# Patient Record
Sex: FEMALE | Race: WHITE | NOT HISPANIC OR LATINO | Employment: OTHER | URBAN - NONMETROPOLITAN AREA
[De-identification: names, ages, dates, MRNs, and addresses within clinical notes are randomized per-mention and may not be internally consistent; named-entity substitution may affect disease eponyms.]

---

## 2017-02-15 ENCOUNTER — APPOINTMENT (OUTPATIENT)
Dept: CT IMAGING | Facility: HOSPITAL | Age: 57
End: 2017-02-15

## 2017-02-15 ENCOUNTER — HOSPITAL ENCOUNTER (OUTPATIENT)
Facility: HOSPITAL | Age: 57
Setting detail: OBSERVATION
Discharge: HOME | End: 2017-02-17
Attending: EMERGENCY MEDICINE | Admitting: HOSPITALIST

## 2017-02-15 ENCOUNTER — APPOINTMENT (OUTPATIENT)
Dept: GENERAL RADIOLOGY | Facility: HOSPITAL | Age: 57
End: 2017-02-15

## 2017-02-15 DIAGNOSIS — R94.31 ABNORMAL EKG: ICD-10-CM

## 2017-02-15 DIAGNOSIS — R42 DIZZINESS: Primary | ICD-10-CM

## 2017-02-15 DIAGNOSIS — I21.4 NON-STEMI (NON-ST ELEVATED MYOCARDIAL INFARCTION) (HCC): ICD-10-CM

## 2017-02-15 LAB
ALBUMIN SERPL-MCNC: 4.4 G/DL (ref 3.4–4.8)
ALBUMIN/GLOB SERPL: 1.4 G/DL (ref 1.1–1.8)
ALP SERPL-CCNC: 80 U/L (ref 38–126)
ALT SERPL W P-5'-P-CCNC: 34 U/L (ref 9–52)
ANION GAP SERPL CALCULATED.3IONS-SCNC: 9 MMOL/L (ref 5–15)
APTT PPP: 25 SECONDS (ref 20–40.3)
AST SERPL-CCNC: 26 U/L (ref 14–36)
BASOPHILS # BLD AUTO: 0.02 10*3/MM3 (ref 0–0.2)
BASOPHILS NFR BLD AUTO: 0.2 % (ref 0–2)
BILIRUB SERPL-MCNC: 0.4 MG/DL (ref 0.2–1.3)
BILIRUB UR QL STRIP: NEGATIVE
BUN BLD-MCNC: 18 MG/DL (ref 7–21)
BUN/CREAT SERPL: 25.4 (ref 7–25)
CALCIUM SPEC-SCNC: 8.8 MG/DL (ref 8.4–10.2)
CHLORIDE SERPL-SCNC: 99 MMOL/L (ref 95–110)
CK MB SERPL-CCNC: 2.64 NG/ML (ref 0–5)
CK SERPL-CCNC: 80 U/L (ref 30–135)
CLARITY UR: ABNORMAL
CO2 SERPL-SCNC: 30 MMOL/L (ref 22–31)
COLOR UR: YELLOW
CREAT BLD-MCNC: 0.71 MG/DL (ref 0.5–1)
D-DIMER, QUANTITATIVE (MAD,POW, STR): <270 NG/ML (FEU) (ref 0–470)
DEPRECATED RDW RBC AUTO: 37.2 FL (ref 36.4–46.3)
EOSINOPHIL # BLD AUTO: 0.18 10*3/MM3 (ref 0–0.7)
EOSINOPHIL NFR BLD AUTO: 1.4 % (ref 0–7)
ERYTHROCYTE [DISTWIDTH] IN BLOOD BY AUTOMATED COUNT: 12.9 % (ref 11.5–14.5)
GFR SERPL CREATININE-BSD FRML MDRD: 102 ML/MIN/1.73 (ref 51–120)
GFR SERPL CREATININE-BSD FRML MDRD: 85 ML/MIN/1.73 (ref 60–120)
GLOBULIN UR ELPH-MCNC: 3.1 GM/DL (ref 2.3–3.5)
GLUCOSE BLD-MCNC: 174 MG/DL (ref 60–100)
GLUCOSE UR STRIP-MCNC: NEGATIVE MG/DL
HCT VFR BLD AUTO: 41.2 % (ref 35–45)
HGB BLD-MCNC: 14.4 G/DL (ref 12–15.5)
HGB UR QL STRIP.AUTO: NEGATIVE
IMM GRANULOCYTES # BLD: 0.03 10*3/MM3 (ref 0–0.02)
IMM GRANULOCYTES NFR BLD: 0.2 % (ref 0–0.5)
INR PPP: 0.94 (ref 0.8–1.2)
KETONES UR QL STRIP: NEGATIVE
LEUKOCYTE ESTERASE UR QL STRIP.AUTO: NEGATIVE
LYMPHOCYTES # BLD AUTO: 1.9 10*3/MM3 (ref 0.6–4.2)
LYMPHOCYTES NFR BLD AUTO: 14.8 % (ref 10–50)
MCH RBC QN AUTO: 28 PG (ref 26.5–34)
MCHC RBC AUTO-ENTMCNC: 35 G/DL (ref 31.4–36)
MCV RBC AUTO: 80 FL (ref 80–98)
MONOCYTES # BLD AUTO: 0.77 10*3/MM3 (ref 0–0.9)
MONOCYTES NFR BLD AUTO: 6 % (ref 0–12)
NEUTROPHILS # BLD AUTO: 9.94 10*3/MM3 (ref 2–8.6)
NEUTROPHILS NFR BLD AUTO: 77.4 % (ref 37–80)
NITRITE UR QL STRIP: NEGATIVE
PH UR STRIP.AUTO: 7 [PH] (ref 5–9)
PLATELET # BLD AUTO: 271 10*3/MM3 (ref 150–450)
PMV BLD AUTO: 10.6 FL (ref 8–12)
POTASSIUM BLD-SCNC: 3.3 MMOL/L (ref 3.5–5.1)
PROT SERPL-MCNC: 7.5 G/DL (ref 6.3–8.6)
PROT UR QL STRIP: NEGATIVE
PROTHROMBIN TIME: 12.6 SECONDS (ref 11.1–15.3)
RBC # BLD AUTO: 5.15 10*6/MM3 (ref 3.77–5.16)
SODIUM BLD-SCNC: 138 MMOL/L (ref 137–145)
SP GR UR STRIP: 1.02 (ref 1–1.03)
UROBILINOGEN UR QL STRIP: ABNORMAL
WBC NRBC COR # BLD: 12.84 10*3/MM3 (ref 3.2–9.8)

## 2017-02-15 PROCEDURE — 81003 URINALYSIS AUTO W/O SCOPE: CPT | Performed by: EMERGENCY MEDICINE

## 2017-02-15 PROCEDURE — 99284 EMERGENCY DEPT VISIT MOD MDM: CPT

## 2017-02-15 PROCEDURE — 84484 ASSAY OF TROPONIN QUANT: CPT | Performed by: EMERGENCY MEDICINE

## 2017-02-15 PROCEDURE — 70450 CT HEAD/BRAIN W/O DYE: CPT

## 2017-02-15 PROCEDURE — 93005 ELECTROCARDIOGRAM TRACING: CPT

## 2017-02-15 PROCEDURE — 85610 PROTHROMBIN TIME: CPT | Performed by: EMERGENCY MEDICINE

## 2017-02-15 PROCEDURE — 85025 COMPLETE CBC W/AUTO DIFF WBC: CPT | Performed by: EMERGENCY MEDICINE

## 2017-02-15 PROCEDURE — 85730 THROMBOPLASTIN TIME PARTIAL: CPT | Performed by: EMERGENCY MEDICINE

## 2017-02-15 PROCEDURE — 82550 ASSAY OF CK (CPK): CPT | Performed by: EMERGENCY MEDICINE

## 2017-02-15 PROCEDURE — 85379 FIBRIN DEGRADATION QUANT: CPT | Performed by: EMERGENCY MEDICINE

## 2017-02-15 PROCEDURE — 82553 CREATINE MB FRACTION: CPT | Performed by: EMERGENCY MEDICINE

## 2017-02-15 PROCEDURE — 96360 HYDRATION IV INFUSION INIT: CPT

## 2017-02-15 PROCEDURE — 71010 HC CHEST PA OR AP: CPT

## 2017-02-15 PROCEDURE — 80053 COMPREHEN METABOLIC PANEL: CPT | Performed by: EMERGENCY MEDICINE

## 2017-02-15 PROCEDURE — 93010 ELECTROCARDIOGRAM REPORT: CPT | Performed by: INTERNAL MEDICINE

## 2017-02-15 RX ORDER — SODIUM CHLORIDE 9 MG/ML
125 INJECTION, SOLUTION INTRAVENOUS CONTINUOUS
Status: DISCONTINUED | OUTPATIENT
Start: 2017-02-15 | End: 2017-02-17 | Stop reason: HOSPADM

## 2017-02-15 RX ORDER — SODIUM CHLORIDE 0.9 % (FLUSH) 0.9 %
10 SYRINGE (ML) INJECTION AS NEEDED
Status: DISCONTINUED | OUTPATIENT
Start: 2017-02-15 | End: 2017-02-17 | Stop reason: HOSPADM

## 2017-02-15 RX ORDER — ASPIRIN 81 MG/1
324 TABLET, CHEWABLE ORAL ONCE
Status: COMPLETED | OUTPATIENT
Start: 2017-02-15 | End: 2017-02-16

## 2017-02-15 RX ORDER — MULTIPLE VITAMINS W/ MINERALS TAB 9MG-400MCG
1 TAB ORAL DAILY
COMMUNITY

## 2017-02-15 RX ADMIN — SODIUM CHLORIDE 125 ML/HR: 900 INJECTION, SOLUTION INTRAVENOUS at 23:19

## 2017-02-15 RX ADMIN — SODIUM CHLORIDE 125 ML/HR: 900 INJECTION, SOLUTION INTRAVENOUS at 23:25

## 2017-02-16 ENCOUNTER — APPOINTMENT (OUTPATIENT)
Dept: ULTRASOUND IMAGING | Facility: HOSPITAL | Age: 57
End: 2017-02-16

## 2017-02-16 ENCOUNTER — APPOINTMENT (OUTPATIENT)
Dept: CARDIOLOGY | Facility: HOSPITAL | Age: 57
End: 2017-02-16
Attending: HOSPITALIST

## 2017-02-16 PROBLEM — I10 HYPERTENSION: Chronic | Status: ACTIVE | Noted: 2017-02-16

## 2017-02-16 PROBLEM — I21.4 NON-STEMI (NON-ST ELEVATED MYOCARDIAL INFARCTION) (HCC): Status: ACTIVE | Noted: 2017-02-16

## 2017-02-16 PROBLEM — R42 DIZZINESS: Status: ACTIVE | Noted: 2017-02-16

## 2017-02-16 LAB
ANION GAP SERPL CALCULATED.3IONS-SCNC: 10 MMOL/L (ref 5–15)
ANISOCYTOSIS BLD QL: NORMAL
BASOPHILS # BLD AUTO: 0.08 10*3/MM3 (ref 0–0.2)
BASOPHILS NFR BLD AUTO: 1.1 % (ref 0–2)
BUN BLD-MCNC: 12 MG/DL (ref 7–21)
BUN/CREAT SERPL: 19 (ref 7–25)
CALCIUM SPEC-SCNC: 8.7 MG/DL (ref 8.4–10.2)
CHLORIDE SERPL-SCNC: 107 MMOL/L (ref 95–110)
CK MB SERPL-CCNC: 17.8 NG/ML (ref 0–5)
CK SERPL-CCNC: 145 U/L (ref 30–135)
CO2 SERPL-SCNC: 25 MMOL/L (ref 22–31)
CREAT BLD-MCNC: 0.63 MG/DL (ref 0.5–1)
DEPRECATED RDW RBC AUTO: 37.7 FL (ref 36.4–46.3)
EOSINOPHIL # BLD AUTO: 0.25 10*3/MM3 (ref 0–0.7)
EOSINOPHIL NFR BLD AUTO: 3.4 % (ref 0–7)
ERYTHROCYTE [DISTWIDTH] IN BLOOD BY AUTOMATED COUNT: 13.3 % (ref 11.5–14.5)
GFR SERPL CREATININE-BSD FRML MDRD: 97 ML/MIN/1.73 (ref 51–120)
GLUCOSE BLD-MCNC: 134 MG/DL (ref 60–100)
HCT VFR BLD AUTO: 38.7 % (ref 35–45)
HGB BLD-MCNC: 13.6 G/DL (ref 12–15.5)
HOLD SPECIMEN: NORMAL
HOLD SPECIMEN: NORMAL
IMM GRANULOCYTES # BLD: 0.02 10*3/MM3 (ref 0–0.02)
IMM GRANULOCYTES NFR BLD: 0.3 % (ref 0–0.5)
LYMPHOCYTES # BLD AUTO: 2.49 10*3/MM3 (ref 0.6–4.2)
LYMPHOCYTES NFR BLD AUTO: 33.7 % (ref 10–50)
MCH RBC QN AUTO: 27.5 PG (ref 26.5–34)
MCHC RBC AUTO-ENTMCNC: 35.1 G/DL (ref 31.4–36)
MCV RBC AUTO: 78.3 FL (ref 80–98)
MICROCYTES BLD QL: NORMAL
MONOCYTES # BLD AUTO: 0.63 10*3/MM3 (ref 0–0.9)
MONOCYTES NFR BLD AUTO: 8.5 % (ref 0–12)
NEUTROPHILS # BLD AUTO: 3.91 10*3/MM3 (ref 2–8.6)
NEUTROPHILS NFR BLD AUTO: 53 % (ref 37–80)
NRBC BLD MANUAL-RTO: 4.5 /100 WBC (ref 0–0)
PLATELET # BLD AUTO: 227 10*3/MM3 (ref 150–450)
PMV BLD AUTO: 11.2 FL (ref 8–12)
POTASSIUM BLD-SCNC: 3.5 MMOL/L (ref 3.5–5.1)
RBC # BLD AUTO: 4.94 10*6/MM3 (ref 3.77–5.16)
SMALL PLATELETS BLD QL SMEAR: ADEQUATE
SODIUM BLD-SCNC: 142 MMOL/L (ref 137–145)
TROPONIN I SERPL-MCNC: 0.02 NG/ML
TROPONIN I SERPL-MCNC: 1.71 NG/ML
TROPONIN I SERPL-MCNC: 3.65 NG/ML
TROPONIN I SERPL-MCNC: 3.79 NG/ML
WBC MORPH BLD: NORMAL
WBC NRBC COR # BLD: 7.38 10*3/MM3 (ref 3.2–9.8)
WHOLE BLOOD HOLD SPECIMEN: NORMAL
WHOLE BLOOD HOLD SPECIMEN: NORMAL

## 2017-02-16 PROCEDURE — 93306 TTE W/DOPPLER COMPLETE: CPT | Performed by: INTERNAL MEDICINE

## 2017-02-16 PROCEDURE — 93010 ELECTROCARDIOGRAM REPORT: CPT | Performed by: INTERNAL MEDICINE

## 2017-02-16 PROCEDURE — G0378 HOSPITAL OBSERVATION PER HR: HCPCS

## 2017-02-16 PROCEDURE — C1894 INTRO/SHEATH, NON-LASER: HCPCS | Performed by: INTERNAL MEDICINE

## 2017-02-16 PROCEDURE — C1760 CLOSURE DEV, VASC: HCPCS | Performed by: INTERNAL MEDICINE

## 2017-02-16 PROCEDURE — 85025 COMPLETE CBC W/AUTO DIFF WBC: CPT | Performed by: HOSPITALIST

## 2017-02-16 PROCEDURE — 82553 CREATINE MB FRACTION: CPT | Performed by: INTERNAL MEDICINE

## 2017-02-16 PROCEDURE — 82550 ASSAY OF CK (CPK): CPT | Performed by: INTERNAL MEDICINE

## 2017-02-16 PROCEDURE — 93005 ELECTROCARDIOGRAM TRACING: CPT | Performed by: HOSPITALIST

## 2017-02-16 PROCEDURE — C1769 GUIDE WIRE: HCPCS | Performed by: INTERNAL MEDICINE

## 2017-02-16 PROCEDURE — 25010000002 MIDAZOLAM PER 1 MG: Performed by: INTERNAL MEDICINE

## 2017-02-16 PROCEDURE — 80048 BASIC METABOLIC PNL TOTAL CA: CPT | Performed by: HOSPITALIST

## 2017-02-16 PROCEDURE — 93306 TTE W/DOPPLER COMPLETE: CPT

## 2017-02-16 PROCEDURE — 25010000002 HYDROMORPHONE PER 4 MG: Performed by: INTERNAL MEDICINE

## 2017-02-16 PROCEDURE — 84484 ASSAY OF TROPONIN QUANT: CPT | Performed by: EMERGENCY MEDICINE

## 2017-02-16 PROCEDURE — C1887 CATHETER, GUIDING: HCPCS | Performed by: INTERNAL MEDICINE

## 2017-02-16 PROCEDURE — 93458 L HRT ARTERY/VENTRICLE ANGIO: CPT | Performed by: INTERNAL MEDICINE

## 2017-02-16 PROCEDURE — 0 IOPAMIDOL PER 1 ML: Performed by: INTERNAL MEDICINE

## 2017-02-16 PROCEDURE — 85007 BL SMEAR W/DIFF WBC COUNT: CPT | Performed by: HOSPITALIST

## 2017-02-16 PROCEDURE — 96361 HYDRATE IV INFUSION ADD-ON: CPT

## 2017-02-16 PROCEDURE — 84484 ASSAY OF TROPONIN QUANT: CPT | Performed by: INTERNAL MEDICINE

## 2017-02-16 PROCEDURE — 93880 EXTRACRANIAL BILAT STUDY: CPT

## 2017-02-16 RX ORDER — ONDANSETRON 4 MG/1
4 TABLET, FILM COATED ORAL EVERY 6 HOURS PRN
Status: DISCONTINUED | OUTPATIENT
Start: 2017-02-16 | End: 2017-02-17 | Stop reason: HOSPADM

## 2017-02-16 RX ORDER — LOSARTAN POTASSIUM 25 MG/1
25 TABLET ORAL
Status: DISCONTINUED | OUTPATIENT
Start: 2017-02-16 | End: 2017-02-17 | Stop reason: HOSPADM

## 2017-02-16 RX ORDER — CARVEDILOL 3.12 MG/1
3.12 TABLET ORAL 2 TIMES DAILY WITH MEALS
Status: DISCONTINUED | OUTPATIENT
Start: 2017-02-16 | End: 2017-02-17 | Stop reason: HOSPADM

## 2017-02-16 RX ORDER — MORPHINE SULFATE 2 MG/ML
1 INJECTION, SOLUTION INTRAMUSCULAR; INTRAVENOUS EVERY 4 HOURS PRN
Status: DISCONTINUED | OUTPATIENT
Start: 2017-02-16 | End: 2017-02-17 | Stop reason: HOSPADM

## 2017-02-16 RX ORDER — ONDANSETRON 4 MG/1
4 TABLET, ORALLY DISINTEGRATING ORAL EVERY 6 HOURS PRN
Status: DISCONTINUED | OUTPATIENT
Start: 2017-02-16 | End: 2017-02-17 | Stop reason: HOSPADM

## 2017-02-16 RX ORDER — CARVEDILOL 3.12 MG/1
3.12 TABLET ORAL EVERY 12 HOURS SCHEDULED
Status: DISCONTINUED | OUTPATIENT
Start: 2017-02-16 | End: 2017-02-16 | Stop reason: SDUPTHER

## 2017-02-16 RX ORDER — SODIUM CHLORIDE 0.9 % (FLUSH) 0.9 %
1-10 SYRINGE (ML) INJECTION AS NEEDED
Status: DISCONTINUED | OUTPATIENT
Start: 2017-02-16 | End: 2017-02-17 | Stop reason: HOSPADM

## 2017-02-16 RX ORDER — SODIUM CHLORIDE 450 MG/100ML
75 INJECTION, SOLUTION INTRAVENOUS CONTINUOUS
Status: DISCONTINUED | OUTPATIENT
Start: 2017-02-16 | End: 2017-02-17 | Stop reason: HOSPADM

## 2017-02-16 RX ORDER — NALOXONE HCL 0.4 MG/ML
0.4 VIAL (ML) INJECTION
Status: DISCONTINUED | OUTPATIENT
Start: 2017-02-16 | End: 2017-02-17 | Stop reason: HOSPADM

## 2017-02-16 RX ORDER — ONDANSETRON 2 MG/ML
4 INJECTION INTRAMUSCULAR; INTRAVENOUS EVERY 6 HOURS PRN
Status: DISCONTINUED | OUTPATIENT
Start: 2017-02-16 | End: 2017-02-17 | Stop reason: HOSPADM

## 2017-02-16 RX ORDER — MIDAZOLAM HYDROCHLORIDE 1 MG/ML
INJECTION INTRAMUSCULAR; INTRAVENOUS AS NEEDED
Status: DISCONTINUED | OUTPATIENT
Start: 2017-02-16 | End: 2017-02-16 | Stop reason: HOSPADM

## 2017-02-16 RX ORDER — LIDOCAINE HYDROCHLORIDE 20 MG/ML
INJECTION, SOLUTION INFILTRATION; PERINEURAL AS NEEDED
Status: DISCONTINUED | OUTPATIENT
Start: 2017-02-16 | End: 2017-02-16 | Stop reason: HOSPADM

## 2017-02-16 RX ADMIN — ASPIRIN 81 MG 324 MG: 81 TABLET ORAL at 01:12

## 2017-02-16 RX ADMIN — CARVEDILOL 3.12 MG: 3.12 TABLET, FILM COATED ORAL at 10:47

## 2017-02-16 RX ADMIN — SODIUM CHLORIDE 75 ML/HR: 4.5 INJECTION, SOLUTION INTRAVENOUS at 15:23

## 2017-02-16 RX ADMIN — SODIUM CHLORIDE 125 ML/HR: 900 INJECTION, SOLUTION INTRAVENOUS at 07:47

## 2017-02-16 RX ADMIN — CARVEDILOL 3.12 MG: 3.12 TABLET, FILM COATED ORAL at 17:27

## 2017-02-16 RX ADMIN — LOSARTAN POTASSIUM 25 MG: 25 TABLET ORAL at 15:40

## 2017-02-16 NOTE — CONSULTS
Cardiology Consultation Note.        Patient Name: Christina Hobson  Age/Sex: 56 y.o. female  : 1960  MRN: [id number]    Date of consultation: 2017  Consulting Physician: Nick Medina MD  Primary care Physician: No Known Provider  Requesting Physician:  Agustín Nguyen MD   Reason for consultation: Positive troponin suggestive for non-Q myocardial infarction  Subjective:       Chief Complaint: Bilateral arm pain with positive troponin with symptoms of atypical chest pain.    History of Present Illness:  Christina Hobson is a 56 y.o. female     Body mass index is 28.19 kg/(m^2). with the past medical history significant for hyperlipidemia currently not on any pharmacological therapy with no previous history of arterial hypertension who presented to the emergency room with symptoms of bilateral arm pain and some symptoms off dizziness.  Patient initial troponin was mildly elevated with subsequent troponin of 1.7.  Patient resting electrocardiogram did not show any acute ST-T wave changes.    On specific questioning patient denies any symptoms of neck pain and jaw pain and arm pain and shoulder pain or any symptoms of epigastric discomfort.  Patient denies any prolonged episodes of palpitation.  Patient denies previous cardiac evaluation.  Patient has been active and has not had any exertional chest pain.        Past Medical History:  1.  Hyperlipidemia.  2.  Bilateral arm pain with positive troponin suggestive for non-Q myocardial infarction.    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     Social History Main Topics   • Smoking status: Never Smoker    • Smokeless tobacco: Not on file   • Alcohol use No   • Drug use: No   • Sexual activity: Not on file     Other Topics Concern   • Not on file     Social History Narrative   • No narrative on file        Cardiac Risk factor: Post menopausal., Hyperlipidemia and Obesity      Allergies:  No Known Allergies    Medication::  Prescriptions Prior to Admission   Medication Sig Dispense Refill Last Dose   • Multiple Vitamins-Minerals (MULTIVITAMIN WITH MINERALS) tablet tablet Take 1 tablet by mouth Daily.   2/16/2017 at Unknown time           Review of Systems:       Constitutional:  Denies recent weight loss, weight gain, fever or chills, no change in exercise tolerance     HENT:  Denies any hearing loss, epistaxis, hoarseness, or difficulty speaking.     Eyes: Wears eyeglasses or contact lenses     Respiratory:  Denies dyspnea with exertion,no cough, wheezing, or hemoptysis.     Cardiovascular: Negative for palpations, chest pain, orthopnea, PND, peripheral edema, syncope, or claudication.     Gastrointestinal:  Denies change in bowel habits, dyspepsia, ulcer disease, hematochezia, or melena.  No nausea, no vomiting, no hematemesis, no diarrhea or constipation, no melena      Endocrine: Negative for cold intolerance, heat intolerance, polydipsia, polyphagia and polyuria. Denies any history of weight change, heat/cold intolerance, polydipsia, polyuria     Genitourinary: Negative hor hematuria.      Musculoskeletal: Positive for bilateral arm pain Denies any history of arthritic symptoms or back problems .  No joint pain, joint stiffness, joint swelling, muscle pain, muscle weakness and neck pain    Skin:  Denies any change in hair or nails, rashes, or skin lesions.     Allergic/Immunologic: Negative.  Negative for environmental allergies, food allergies and immunocompromised state.     Neurological:  Denies any history of recurrent headaches, strokes, TIA, or seizure disorder.     Hematological: Denies excessive  bleeding, easy bruising, fatigue, lymphadenopathy and petechiae or any bleeding disorders, or lymphadenopathy.     Psychiatric/Behavioral: Denies any history of depression, substance abuse, or change in cognitive function. Denies any psychomotor reaction or tangential thought.  No depression, homicidal ideations and suicidal ideations    Endocrine: No frequent urination and nocturia, temperature intolerance, weight gain, unintended and weight loss, unintended            Objective:     Objective:  Vitals:    02/16/17 0744   BP: 145/69   Pulse: 64   Resp: 18   Temp: 96.7 °F (35.9 °C)   SpO2: 98%     .    Body mass index is 28.19 kg/(m^2).           Physical Exam:   General Appearance:    Alert, oriented, cooperative, in no acute distress   Head:    Normocephalic, atraumatic, without obvious abnormality   Eyes:           PAULA  Lids and lashes normal, conjunctivae and sclerae normal, no icterus, no pallor   Ears:    Ears appear intact with no abnormalities noted   Throat:   Mucous membranes pink and moist   Neck:   Supple, trachea midline, no carotid bruit, no organomegaly or JVD   Lungs:     Clear to auscultation and percussion, respirations regular, even and Unlabored. No wheezes, rales, rhonchi    Heart:    Regular rhythm and normal rate, normal S1 and S2, no            murmur, no gallop, no rub, no click   Abdomen:     Soft, non-tender, non-distended, no guarding, no rebound tenderness, Normal bowel sounds in all four quadrant, no masses, liver and spleen nonpalpable,    Genitalia:    Deferred   Extremities:   Moves all extremities well, no edema, no cyanosis, no              Redness, no clubbing   Pulses:   Pulses palpable and equal bilaterally   Skin:   Moist and warm. No bleeding, bruising or rash   Neurologic/Psychiatric:   Alert and oriented to person, place, and time.  Motor, power and tone in upper and lower extremity is grossly intact.  No focal neurological deficits. Normal cognitive function. No  psychomotor reaction or tangential thought. No depression, homicidal ideations and suicidal ideations           Lab Review:       Results from last 7 days  Lab Units 02/16/17  0613 02/15/17  2158   SODIUM mmol/L 142 138   POTASSIUM mmol/L 3.5 3.3*   CHLORIDE mmol/L 107 99   TOTAL CO2 mmol/L 25.0 30.0   BUN mg/dL 12 18   CREATININE mg/dL 0.63 0.71   CALCIUM mg/dL 8.7 8.8   BILIRUBIN mg/dL  --  0.4   ALK PHOS U/L  --  80   ALT (SGPT) U/L  --  34   AST (SGOT) U/L  --  26   GLUCOSE mg/dL 134* 174*       Results from last 7 days  Lab Units 02/16/17  0613 02/15/17  2158   CK TOTAL U/L  --  80   TROPONIN I ng/mL 1.710* 0.016           Results from last 7 days  Lab Units 02/16/17 0613   WBC 10*3/mm3 7.38   HEMOGLOBIN g/dL 13.6   HEMATOCRIT % 38.7   PLATELETS 10*3/mm3 227       Results from last 7 days  Lab Units 02/15/17  2158   INR  0.94   APTT seconds 25.0                   Invalid input(s):  T4,  FREET4    EKG:   ECG/EMG Results (last 24 hours)     Procedure Component Value Units Date/Time    ECG 12 Lead [[id number]] Collected:  02/15/17 2111     Updated:  02/16/17 0200    ECG 12 Lead [[id number]] Collected:  02/16/17 0120     Updated:  02/16/17 0729    Narrative:       Test Reason : PAINBlood Pressure : **/** mmHGVent. Rate : 075 BPM     Atrial Rate : 075 BPM   P-R Int : 134 ms          QRS Dur : 074 ms    QT Int : 406 ms       P-R-T Axes : 051 021 043 degrees   QTc Int : 453 msNormal sinus rhythmNonspecific T wave   abnormalityAbnormal ECGNo previous ECGs availableReferred By:  JUANY           Confirmed By:           Imaging:  Imaging Results (last 24 hours)     Procedure Component Value Units Date/Time    CT Head Without Contrast [[id number]] Collected:  02/15/17 2316     Updated:  02/15/17 2321    Narrative:       Exam: Head CT without contrast    INDICATION: Dizziness    Technical: Routine head CT without contrast. Sagittal and coronal  reconstructions were obtained. CT technique performed using  [employer].    FINDINGS: The  bony calvarium is intact. The imaged paranasal  sinuses and mastoid air cells are clear. No extra-axial fluid  collection. The ventricular system is normal. Normal gray-white  differentiation. No obvious sign of acute infarction. No  intraparenchymal hemorrhage, mass or midline shift.      Impression:       No obvious acute intracranial abnormality. Consider  MRI for further assessment if clinically warranted.    Electronically signed by:  Hossein Rutledge MD  2/15/2017 11:20 PM  CST Workstation: [email or web address]    XR Chest 1 View [[id number]] Collected:  02/15/17 2321     Updated:  02/15/17 2323    Narrative:       Exam: AP portable chest    INDICATION: Numbness, arm pain    FINDINGS: AP portable chest. The bony structures are intact. The  cardiomediastinal elsewhere is unremarkable. Eventration of the  right hemidiaphragm. Lungs are clear. No pneumothorax or pleural  effusion.      Impression:       No acute cardiopulmonary abnormality.    Electronically signed by:  Hossein Rutledge MD  2/15/2017 11:22 PM  CST Workstation: [email or web address]          I personally viewed and interpreted the patient's EKG/Telemetry data.    Assessment:   1.  Bilateral arm pain with positive troponin of 1.7 suggestive for non-Q myocardial infarction.  2.  Hyperlipidemia.  3.  Obesity.  4.  Labile arterial hypertension.              Plan:   1.  Bilateral arm pain with positive troponin suggestive for non-Q myocardial infarction.  Patient clinically has not had any symptoms of substernal chest pain patient repeat electrocardiogram does reveal evidence of poor R-wave progression.  Patient currently is not having any symptoms of substernal chest pain and neck pain jaw pain.  Patient to bilateral arm pain is probably at the angina equivalent.  Patient has been recommended a coronary angiogram.  Risk-benefit treatment option for the coronary angiogram were discussed with the patient and an informed consent was obtained from the patient for the  coronary angiogram.  Patient Mallampati score #2 patient ASA classification was #2 was for minimal sedation was also discussed with the patient and an informed consent was obtained from the patient for the minimal sedation.  Patient would be started on intravenous fluid and would administer Lovenox pending coronary angiogram.  Patient will also be subjected to a two-dimensional echocardiogram to evaluate the left ventricular systolic function and to rule out any regional segmental wall motion abnormality.  2.  Hyperlipidemia.  Patient would undergo repeat lipid profile check and patient will be started on Lipitor 20 mg at bedtime.  3.  Obesity.  Patient has been counseled on weight reduction lifestyle modification and dietary restriction.  4.  Labile arterial hypertension.  Patient would be started on a low dose of a beta blocker carvedilol 3.125 mg twice a day.  Patient has been counseled to decrease his salt intake      Time: time spent in face-to-face evaluation off greater than 60  minutes and interacting and formulating examining and discussing the plan with the patient with 50% of greater time spent in face-to-face interaction.    Nick Medina MD  02/16/17  8:15 AM    EMR Dragon/Transcription disclaimer:   Some of this note may be an electronic transcription/translation of spoken language to printed text. The electronic translation of spoken language may permit erroneous, or at times, nonsensical words or phrases to be inadvertently transcribed; Although I have reviewed the note for such errors, some may still exist.

## 2017-02-16 NOTE — PROGRESS NOTES
Progress Note  Giovanny Nunez MD  Hospitalist     LOS: 0 days   Patient Care Team:  No Known Provider as PCP - General    Chief Complaint: chest / back discomfort    Subjective     Interval History:     Patient Complaints: of chest / back discomfort, much better by now. Denies previous history of cardiac events. She has been in her usual state of health up until these symptoms started.    History taken from: patient    Medication Review:   Current Facility-Administered Medications   Medication Dose Route Frequency Provider Last Rate Last Dose   • carvedilol (COREG) tablet 3.125 mg  3.125 mg Oral Q12H Giovanny Nunez MD       • Morphine sulfate (PF) injection 1 mg  1 mg Intravenous Q4H PRN Agustín Nguyen MD        And   • naloxone (NARCAN) injection 0.4 mg  0.4 mg Intravenous Q5 Min PRN Agustín Nguyen MD       • ondansetron (ZOFRAN) tablet 4 mg  4 mg Oral Q6H PRN Agustín Nguyen MD        Or   • ondansetron ODT (ZOFRAN-ODT) disintegrating tablet 4 mg  4 mg Oral Q6H PRN Agustín Nguyen MD        Or   • ondansetron (ZOFRAN) injection 4 mg  4 mg Intravenous Q6H PRN Agustín Nguyen MD       • sodium chloride 0.9 % flush 1-10 mL  1-10 mL Intravenous PRN Agustín Nguyen MD       • sodium chloride 0.9 % flush 10 mL  10 mL Intravenous PRN Chepe Pastor MD       • sodium chloride 0.9 % flush 10 mL  10 mL Intravenous PRN Chepe Pastor MD       • sodium chloride 0.9 % infusion  125 mL/hr Intravenous Continuous Chepe Pastor  mL/hr at 02/16/17 0747 125 mL/hr at 02/16/17 0747       Review of Systems:   Review of Systems   Constitutional: Positive for fatigue. Negative for chills and fever.   Respiratory: Negative for shortness of breath.    Cardiovascular: Positive for chest pain. Negative for palpitations and leg swelling.   Gastrointestinal: Negative for abdominal distention, abdominal pain, constipation and diarrhea.   Genitourinary: Negative for dysuria and frequency.   Musculoskeletal: Positive for back pain.  Negative for neck pain.   Neurological: Positive for weakness.   Psychiatric/Behavioral: Negative for agitation.   All other systems reviewed and are negative.      Objective     Vital Signs  Temp:  [96.7 °F (35.9 °C)-97.8 °F (36.6 °C)] 96.7 °F (35.9 °C)  Heart Rate:  [58-84] 64  Resp:  [18-20] 18  BP: (137-183)/(69-98) 145/69    Physical Exam:  Physical Exam   Constitutional: She is oriented to person, place, and time. She appears well-developed and well-nourished.   Eyes: EOM are normal. Pupils are equal, round, and reactive to light.   Neck: Neck supple.   Cardiovascular: Normal rate and regular rhythm.    Pulmonary/Chest: Effort normal and breath sounds normal. No respiratory distress. She has no rales.   Abdominal: Soft. Bowel sounds are normal. She exhibits no distension. There is no tenderness.   Musculoskeletal: Normal range of motion. She exhibits no edema or tenderness.   Neurological: She is alert and oriented to person, place, and time.   Skin: Skin is warm.   Psychiatric: She has a normal mood and affect. Her behavior is normal.   Vitals reviewed.       Results Review:    Lab Results (last 24 hours)     Procedure Component Value Units Date/Time    Urinalysis With / Culture If Indicated [[id number]]  (Abnormal) Collected:  02/15/17 2158    Specimen:  Urine from Urine, Clean Catch Updated:  02/15/17 2229     Color, UA Yellow      Appearance, UA Turbid (A)      pH, UA 7.0      Specific Gravity, UA 1.020      Glucose, UA Negative      Ketones, UA Negative      Bilirubin, UA Negative      Blood, UA Negative      Protein, UA Negative      Leuk Esterase, UA Negative      Nitrite, UA Negative      Urobilinogen, UA 0.2 E.U./dL     Narrative:       Urine microscopic not indicated.    CBC & Differential [[id number]] Collected:  02/15/17 2158    Specimen:  Blood Updated:  02/15/17 2246    Narrative:       The following orders were created for panel order CBC & Differential.  Procedure                                Abnormality         Status                     ---------                               -----------         ------                     CBC Auto Differential[[id number]]         Abnormal            Final result                 Please view results for these tests on the individual orders.    CBC Auto Differential [[id number]]  (Abnormal) Collected:  02/15/17 2158    Specimen:  Blood Updated:  02/15/17 2246     WBC 12.84 (H) 10*3/mm3      RBC 5.15 10*6/mm3      Hemoglobin 14.4 g/dL      Hematocrit 41.2 %      MCV 80.0 fL      MCH 28.0 pg      MCHC 35.0 g/dL      RDW 12.9 %      RDW-SD 37.2 fl      MPV 10.6 fL      Platelets 271 10*3/mm3      Neutrophil % 77.4 %      Lymphocyte % 14.8 %      Monocyte % 6.0 %      Eosinophil % 1.4 %      Basophil % 0.2 %      Immature Grans % 0.2 %      Neutrophils, Absolute 9.94 (H) 10*3/mm3      Lymphocytes, Absolute 1.90 10*3/mm3      Monocytes, Absolute 0.77 10*3/mm3      Eosinophils, Absolute 0.18 10*3/mm3      Basophils, Absolute 0.02 10*3/mm3      Immature Grans, Absolute 0.03 (H) 10*3/mm3     CK [[id number]]  (Normal) Collected:  02/15/17 2158    Specimen:  Blood Updated:  02/15/17 2255     Creatine Kinase 80 U/L     Comprehensive Metabolic Panel [[id number]]  (Abnormal) Collected:  02/15/17 2158    Specimen:  Blood Updated:  02/15/17 2300     Glucose 174 (H) mg/dL      BUN 18 mg/dL      Creatinine 0.71 mg/dL      Sodium 138 mmol/L      Potassium 3.3 (L) mmol/L      Chloride 99 mmol/L      CO2 30.0 mmol/L      Calcium 8.8 mg/dL      Total Protein 7.5 g/dL      Albumin 4.40 g/dL      ALT (SGPT) 34 U/L      AST (SGOT) 26 U/L      Alkaline Phosphatase 80 U/L      Total Bilirubin 0.4 mg/dL      eGFR Non African Amer 85 mL/min/1.73      eGFR  African Amer 102 mL/min/1.73      Globulin 3.1 gm/dL      A/G Ratio 1.4 g/dL      BUN/Creatinine Ratio 25.4 (H)      Anion Gap 9.0 mmol/L     CK-MB [[id number]]  (Normal) Collected:  02/15/17 2158    Specimen:  Blood Updated:  02/15/17 2303     CKMB 2.64  ng/mL     aPTT [[id number]]  (Normal) Collected:  02/15/17 2158    Specimen:  Blood Updated:  02/15/17 2307     PTT 25.0 seconds     Narrative:       The recommended Heparin therapeutic range is 68-97 seconds.    Protime-INR [[id number]]  (Normal) Collected:  02/15/17 2158    Specimen:  Blood Updated:  02/15/17 2307     Protime 12.6 Seconds      INR 0.94     Narrative:       Therapeutic range for most indications is 2.0-3.0 INR,  or 2.5-3.5 for mechanical heart valves.    D-dimer, Quantitative [[id number]]  (Normal) Collected:  02/15/17 2158    Specimen:  Blood Updated:  02/15/17 2307     D-Dimer, Quantitative <270 ng/mL (FEU)     Narrative:       Dimer values <500 ng/ml FEU are FDA approved as aid in diagnosis of deep venous thrombosis and pulmonary embolism.  This test should not be used in an exclusion strategy with pretest probability alone.    A recent guideline regarding diagnosis for pulmonary thomboembolism recommends an adjusted exclusion criterion of age x 10 ng/ml FEU for patients >50 years of age ([employer] Intern Med 2015; 163: 701-711).    Troponin [[id number]]  (Normal) Collected:  02/15/17 2158    Specimen:  Blood Updated:  02/16/17 0156     Troponin I 0.016 ng/mL     [place] Draw [[id number]] Collected:  02/15/17 2158    Specimen:  Blood Updated:  02/16/17 0206    Narrative:       The following orders were created for panel order [place] Draw.  Procedure                               Abnormality         Status                     ---------                               -----------         ------                     Light Blue Top[[id number]]                                    Final result               Green Top (Gel)[[id number]]                                   Final result               Lavender Top[[id number]]                                      Final result               Gold Top - SST[[id number]]                                    Final result                 Please view results for these tests on the individual  orders.    Light Blue Top [[id number]] Collected:  02/15/17 2158    Specimen:  Blood Updated:  02/16/17 0206     Extra Tube hold for add-on       Auto resulted       Green Top (Gel) [[id number]] Collected:  02/15/17 2158    Specimen:  Blood Updated:  02/16/17 0206     Extra Tube Hold for add-ons.       Auto resulted.       Lavender Top [[id number]] Collected:  02/15/17 2158    Specimen:  Blood Updated:  02/16/17 0206     Extra Tube hold for add-on       Auto resulted       Gold Top - SST [[id number]] Collected:  02/15/17 2158    Specimen:  Blood Updated:  02/16/17 0206     Extra Tube Hold for add-ons.       Auto resulted.       CBC Auto Differential [[id number]]  (Abnormal) Collected:  02/16/17 0613    Specimen:  Blood Updated:  02/16/17 0717     WBC 7.38 10*3/mm3      RBC 4.94 10*6/mm3      Hemoglobin 13.6 g/dL      Hematocrit 38.7 %      MCV 78.3 (L) fL      MCH 27.5 pg      MCHC 35.1 g/dL      RDW 13.3 %      RDW-SD 37.7 fl      MPV 11.2 fL      Platelets 227 10*3/mm3      Neutrophil % 53.0 %      Lymphocyte % 33.7 %      Monocyte % 8.5 %      Eosinophil % 3.4 %      Basophil % 1.1 %      Immature Grans % 0.3 %      Neutrophils, Absolute 3.91 10*3/mm3      Lymphocytes, Absolute 2.49 10*3/mm3      Monocytes, Absolute 0.63 10*3/mm3      Eosinophils, Absolute 0.25 10*3/mm3      Basophils, Absolute 0.08 10*3/mm3      Immature Grans, Absolute 0.02 10*3/mm3      nRBC 4.5 (H) /100 WBC     Basic Metabolic Panel [[id number]]  (Abnormal) Collected:  02/16/17 0613    Specimen:  Blood Updated:  02/16/17 0729     Glucose 134 (H) mg/dL      BUN 12 mg/dL      Creatinine 0.63 mg/dL      Sodium 142 mmol/L      Potassium 3.5 mmol/L      Chloride 107 mmol/L      CO2 25.0 mmol/L      Calcium 8.7 mg/dL      eGFR Non African Amer 97 mL/min/1.73      BUN/Creatinine Ratio 19.0      Anion Gap 10.0 mmol/L     Troponin [[id number]]  (Abnormal) Collected:  02/16/17 0613    Specimen:  Blood Updated:  02/16/17 0754     Troponin I 1.710 (C) ng/mL     Scan  Slide [[id number]] Collected:  02/16/17 0613    Specimen:  Blood Updated:  02/16/17 0953     Anisocytosis Slight/1+      Microcytes Slight/1+      WBC Morphology Normal      Platelet Estimate Adequate     Troponin [[id number]] Collected:  02/16/17 1001    Specimen:  Blood Updated:  02/16/17 1004          Imaging Results (last 24 hours)     Procedure Component Value Units Date/Time    CT Head Without Contrast [[id number]] Collected:  02/15/17 2316     Updated:  02/15/17 2321    Narrative:       Exam: Head CT without contrast    INDICATION: Dizziness    Technical: Routine head CT without contrast. Sagittal and coronal  reconstructions were obtained. CT technique performed using  [employer].    FINDINGS: The bony calvarium is intact. The imaged paranasal  sinuses and mastoid air cells are clear. No extra-axial fluid  collection. The ventricular system is normal. Normal gray-white  differentiation. No obvious sign of acute infarction. No  intraparenchymal hemorrhage, mass or midline shift.      Impression:       No obvious acute intracranial abnormality. Consider  MRI for further assessment if clinically warranted.    Electronically signed by:  Hossein Rutledge MD  2/15/2017 11:20 PM  CST Workstation: [employer]    XR Chest 1 View [[id number]] Collected:  02/15/17 2321     Updated:  02/15/17 2323    Narrative:       Exam: AP portable chest    INDICATION: Numbness, arm pain    FINDINGS: AP portable chest. The bony structures are intact. The  cardiomediastinal elsewhere is unremarkable. Eventration of the  right hemidiaphragm. Lungs are clear. No pneumothorax or pleural  effusion.      Impression:       No acute cardiopulmonary abnormality.    Electronically signed by:  Hossein Rutledge MD  2/15/2017 11:22 PM  CST Workstation: [employer]    US Carotid Bilateral [[id number]]      Updated:  02/16/17 0916          Assessment/Plan     Principal Problem:    Non-STEMI (non-ST elevated myocardial infarction)  Active Problems:     Hypertension      Continue with the beta blocker, Aspirin, nitroglycerin. Cardiology consult appreciated. She will require a coronary angiogram given her symptoms, Troponin elevation.    Giovanny Nunez MD  02/16/17  10:30 AM

## 2017-02-16 NOTE — H&P
[facility] Medicine Admission      Date of Admission: 2/15/2017      Primary Care Physician: No Known Provider    Following information is obtained partially from patient, family and/or medical records.    Chief Complaint:   Chief Complaint   Patient presents with   • Dizziness   • Arm Pain       HPI: Pt is a 56 y.o. female presenting with sudden onset of bilateral upper extremity pain that happened around 7 PM.  She states that the pain started at the elbow and radiated distally, describes it as aching, associated symptoms include lightheadedness and diaphoresis.  She denies any active chest pain at the time, denies any fever or chills no shortness of breath.  Her symptoms lasted roughly 3-4 hours, she came to the ER as soon after being in the ER her symptoms resolved.  At the time of my examination she is symptom-free.       Concurrent Medical History:   Patient Active Problem List   Diagnosis   • Dizziness    patient reports no medical problems in the past    Past Surgical History: History reviewed. No pertinent past surgical history.    Family History: family history is not on file.    Social History:  reports that she has never smoked. She does not have any smokeless tobacco history on file. She reports that she does not drink alcohol or use illicit drugs.    Allergies: No Known Allergies    Home Medications:   Prior to Admission medications    Medication Sig Start Date End Date Taking? Authorizing Provider   Multiple Vitamins-Minerals (MULTIVITAMIN WITH MINERALS) tablet tablet Take 1 tablet by mouth Daily.   Yes Historical Provider, MD       Review of Systems:  Review of Systems   Constitutional: Negative.    HENT: Negative.    Eyes: Negative.    Respiratory: Negative.    Cardiovascular: Negative.    Gastrointestinal: Negative.    Endocrine: Negative.    Genitourinary: Negative.    Musculoskeletal: Positive for myalgias.   Skin: Negative.    Neurological: Positive  for syncope.      Otherwise complete ROS is negative except as mentioned above and in HPI.    Physical Exam:   Temp:  [97.8 °F (36.6 °C)] 97.8 °F (36.6 °C)  Heart Rate:  [64-84] 80  Resp:  [18-20] 20  BP: (146-183)/(70-98) 146/70  Physical Exam   Constitutional: She is oriented to person, place, and time. She appears well-developed and well-nourished.   HENT:   Head: Normocephalic and atraumatic.   Nose: Nose normal.   Mouth/Throat: Oropharynx is clear and moist.   Eyes: Conjunctivae are normal. Pupils are equal, round, and reactive to light. No scleral icterus.   Neck: No tracheal deviation present.   Cardiovascular: Normal heart sounds.  Exam reveals no friction rub.    Pulmonary/Chest: Effort normal and breath sounds normal. No respiratory distress. She has no wheezes. She has no rales.   Abdominal: Soft. Bowel sounds are normal. She exhibits no distension. There is no tenderness.   Musculoskeletal: Normal range of motion.   Neurological: She is alert and oriented to person, place, and time.   Skin: Skin is warm and dry.         Results Reviewed:  I have personally reviewed current lab, radiology, and data and agree with results.  Lab Results (last 24 hours)     Procedure Component Value Units Date/Time    Urinalysis With / Culture If Indicated [[id number]]  (Abnormal) Collected:  02/15/17 2158    Specimen:  Urine from Urine, Clean Catch Updated:  02/15/17 2229     Color, UA Yellow      Appearance, UA Turbid (A)      pH, UA 7.0      Specific Gravity, UA 1.020      Glucose, UA Negative      Ketones, UA Negative      Bilirubin, UA Negative      Blood, UA Negative      Protein, UA Negative      Leuk Esterase, UA Negative      Nitrite, UA Negative      Urobilinogen, UA 0.2 E.U./dL     Narrative:       Urine microscopic not indicated.    CBC & Differential [[id number]] Collected:  02/15/17 2158    Specimen:  Blood Updated:  02/15/17 2246    Narrative:       The following orders were created for panel order CBC &  Differential.  Procedure                               Abnormality         Status                     ---------                               -----------         ------                     CBC Auto Differential[[id number]]         Abnormal            Final result                 Please view results for these tests on the individual orders.    CBC Auto Differential [[id number]]  (Abnormal) Collected:  02/15/17 2158    Specimen:  Blood Updated:  02/15/17 2246     WBC 12.84 (H) 10*3/mm3      RBC 5.15 10*6/mm3      Hemoglobin 14.4 g/dL      Hematocrit 41.2 %      MCV 80.0 fL      MCH 28.0 pg      MCHC 35.0 g/dL      RDW 12.9 %      RDW-SD 37.2 fl      MPV 10.6 fL      Platelets 271 10*3/mm3      Neutrophil % 77.4 %      Lymphocyte % 14.8 %      Monocyte % 6.0 %      Eosinophil % 1.4 %      Basophil % 0.2 %      Immature Grans % 0.2 %      Neutrophils, Absolute 9.94 (H) 10*3/mm3      Lymphocytes, Absolute 1.90 10*3/mm3      Monocytes, Absolute 0.77 10*3/mm3      Eosinophils, Absolute 0.18 10*3/mm3      Basophils, Absolute 0.02 10*3/mm3      Immature Grans, Absolute 0.03 (H) 10*3/mm3     CK [[id number]]  (Normal) Collected:  02/15/17 2158    Specimen:  Blood Updated:  02/15/17 2255     Creatine Kinase 80 U/L     Comprehensive Metabolic Panel [[id number]]  (Abnormal) Collected:  02/15/17 2158    Specimen:  Blood Updated:  02/15/17 2300     Glucose 174 (H) mg/dL      BUN 18 mg/dL      Creatinine 0.71 mg/dL      Sodium 138 mmol/L      Potassium 3.3 (L) mmol/L      Chloride 99 mmol/L      CO2 30.0 mmol/L      Calcium 8.8 mg/dL      Total Protein 7.5 g/dL      Albumin 4.40 g/dL      ALT (SGPT) 34 U/L      AST (SGOT) 26 U/L      Alkaline Phosphatase 80 U/L      Total Bilirubin 0.4 mg/dL      eGFR Non African Amer 85 mL/min/1.73      eGFR  African Amer 102 mL/min/1.73      Globulin 3.1 gm/dL      A/G Ratio 1.4 g/dL      BUN/Creatinine Ratio 25.4 (H)      Anion Gap 9.0 mmol/L     CK-MB [[id number]]  (Normal) Collected:  02/15/17 3932     Specimen:  Blood Updated:  02/15/17 2303     CKMB 2.64 ng/mL     aPTT [[id number]]  (Normal) Collected:  02/15/17 2158    Specimen:  Blood Updated:  02/15/17 2307     PTT 25.0 seconds     Narrative:       The recommended Heparin therapeutic range is 68-97 seconds.    Protime-INR [[id number]]  (Normal) Collected:  02/15/17 2158    Specimen:  Blood Updated:  02/15/17 2307     Protime 12.6 Seconds      INR 0.94     Narrative:       Therapeutic range for most indications is 2.0-3.0 INR,  or 2.5-3.5 for mechanical heart valves.    D-dimer, Quantitative [[id number]]  (Normal) Collected:  02/15/17 2158    Specimen:  Blood Updated:  02/15/17 2307     D-Dimer, Quantitative <270 ng/mL (FEU)     Narrative:       Dimer values <500 ng/ml FEU are FDA approved as aid in diagnosis of deep venous thrombosis and pulmonary embolism.  This test should not be used in an exclusion strategy with pretest probability alone.    A recent guideline regarding diagnosis for pulmonary thomboembolism recommends an adjusted exclusion criterion of age x 10 ng/ml FEU for patients >50 years of age (Olga Intern Med 2015; 163: 701-711).    Troponin [[id number]]  (Normal) Collected:  02/15/17 2158    Specimen:  Blood Updated:  02/16/17 0156     Troponin I 0.016 ng/mL     [place] Draw [[id number]] Collected:  02/15/17 2158    Specimen:  Blood Updated:  02/16/17 0206    Narrative:       The following orders were created for panel order [place] Draw.  Procedure                               Abnormality         Status                     ---------                               -----------         ------                     Light Blue Top[[id number]]                                    Final result               Green Top (Gel)[[id number]]                                   Final result               Lavender Top[[id number]]                                      Final result               Gold Top - SST[[id number]]                                    Final result                  Please view results for these tests on the individual orders.    Light Blue Top [[id number]] Collected:  02/15/17 2158    Specimen:  Blood Updated:  02/16/17 0206     Extra Tube hold for add-on       Auto resulted       Green Top (Gel) [[id number]] Collected:  02/15/17 2158    Specimen:  Blood Updated:  02/16/17 0206     Extra Tube Hold for add-ons.       Auto resulted.       Lavender Top [[id number]] Collected:  02/15/17 2158    Specimen:  Blood Updated:  02/16/17 0206     Extra Tube hold for add-on       Auto resulted       Gold Top - SST [[id number]] Collected:  02/15/17 2158    Specimen:  Blood Updated:  02/16/17 0206     Extra Tube Hold for add-ons.       Auto resulted.           Imaging Results (last 24 hours)     Procedure Component Value Units Date/Time    CT Head Without Contrast [[id number]] Collected:  02/15/17 2316     Updated:  02/15/17 2321    Narrative:       Exam: Head CT without contrast    INDICATION: Dizziness    Technical: Routine head CT without contrast. Sagittal and coronal  reconstructions were obtained. CT technique performed using  [email or web address].    FINDINGS: The bony calvarium is intact. The imaged paranasal  sinuses and mastoid air cells are clear. No extra-axial fluid  collection. The ventricular system is normal. Normal gray-white  differentiation. No obvious sign of acute infarction. No  intraparenchymal hemorrhage, mass or midline shift.      Impression:       No obvious acute intracranial abnormality. Consider  MRI for further assessment if clinically warranted.    Electronically signed by:  Hossein Rutledge MD  2/15/2017 11:20 PM  CST Workstation: [id number]    [facility] Chest 1 View [[id number]] Collected:  02/15/17 2321     Updated:  02/15/17 2323    Narrative:       Exam: AP portable chest    INDICATION: Numbness, arm pain    FINDINGS: AP portable chest. The bony structures are intact. The  cardiomediastinal elsewhere is unremarkable. Eventration of the  right hemidiaphragm. Lungs are clear. No  pneumothorax or pleural  effusion.      Impression:       No acute cardiopulmonary abnormality.    Electronically signed by:  Hossein Rutledge MD  2/15/2017 11:22 PM  CST Workstation: [employer]              Assessment and Plan:  Near syncopal episode: Will obtain echocardiogram, will obtain carotid Doppler, will place patient on telemetry, will obtain cardiac enzymes.     I discussed the patients findings and my recommendations with: Patient and family    Agustín Nguyen MD  02/16/17  5:20 AM

## 2017-02-16 NOTE — PLAN OF CARE
Problem: Patient Care Overview (Adult)  Goal: Plan of Care Review  Outcome: Ongoing (interventions implemented as appropriate)    02/16/17 1642   Coping/Psychosocial Response Interventions   Plan Of Care Reviewed With patient   Patient Care Overview   Progress improving   Outcome Evaluation   Outcome Summary/Follow up Plan had HC today, tolerated well       Goal: Adult Individualization and Mutuality  Outcome: Ongoing (interventions implemented as appropriate)  Goal: Discharge Needs Assessment  Outcome: Ongoing (interventions implemented as appropriate)    Problem: Fall Risk (Adult)  Goal: Identify Related Risk Factors and Signs and Symptoms  Outcome: Outcome(s) achieved Date Met:  02/16/17  Goal: Absence of Falls  Outcome: Ongoing (interventions implemented as appropriate)

## 2017-02-16 NOTE — ED PROVIDER NOTES
Subjective   HPI Comments: 57yo female presents ED c/o onset bilateral upper extremity pain 1900hrs extending from bilateral elbows to hands, nontraumatic.  ROS (+) dizziness.  Denies fever/chest pain/soa/syncope/palpitations.    Patient is a 56 y.o. female presenting with general illness.   Illness   Severity:  Moderate  Onset quality:  Sudden  Progression:  Improving  Chronicity:  New  Associated symptoms: no abdominal pain, no chest pain, no fever, no nausea, no shortness of breath and no vomiting        Review of Systems   Constitutional: Negative for fever.   Respiratory: Negative for shortness of breath.    Cardiovascular: Negative for chest pain and palpitations.   Gastrointestinal: Negative for abdominal pain, nausea and vomiting.   Neurological: Positive for dizziness. Negative for syncope, weakness and numbness.       History reviewed. No pertinent past medical history.    No Known Allergies    History reviewed. No pertinent past surgical history.    History reviewed. No pertinent family history.    Social History     Social History   • Marital status:      Spouse name: N/A   • Number of children: N/A   • Years of education: N/A     Social History Main Topics   • Smoking status: Never Smoker   • Smokeless tobacco: None   • Alcohol use No   • Drug use: No   • Sexual activity: Not Asked     Other Topics Concern   • None     Social History Narrative   • None           Objective   Physical Exam   Constitutional: She is oriented to person, place, and time. She appears well-developed and well-nourished.   HENT:   Head: Normocephalic and atraumatic.   Mouth/Throat: Oropharynx is clear and moist.   Eyes: EOM are normal. Pupils are equal, round, and reactive to light.   Neck: Normal range of motion. Neck supple. No JVD present. No tracheal deviation present.   Cardiovascular: Normal rate, regular rhythm, normal heart sounds and intact distal pulses.  Exam reveals no gallop and no friction rub.    No murmur  heard.  Pulmonary/Chest: Effort normal and breath sounds normal. No respiratory distress. She has no wheezes. She has no rales.   Abdominal: Soft. There is no tenderness. There is no rebound and no guarding.   Musculoskeletal: Normal range of motion. She exhibits no tenderness.   Lymphadenopathy:     She has no cervical adenopathy.   Neurological: She is alert and oriented to person, place, and time. She has normal strength. No cranial nerve deficit or sensory deficit. Coordination normal. GCS eye subscore is 4. GCS verbal subscore is 5. GCS motor subscore is 6.   Nursing note and vitals reviewed.      ECG 12 Lead    Date/Time: 2/15/2017 11:54 PM  Performed by: BENNY HARRINGTON  Authorized by: BENNY HARRINGTON   Interpreted by physician  Rhythm: sinus rhythm  Rate: normal  BPM: 72  QRS axis: normal  Conduction: conduction normal  ST Segments: ST segments normal  T elevation: III  Other: no other findings  Clinical impression: non-specific ECG                 ED Course  ED Course   Comment By Time   Ekg#2:nsr/rate 75/nl axis Benny Harrington MD 02/16 0127   Pt resting comfortably w/o complaints. Benny Harrington MD 02/16 0158      Labs Reviewed   URINALYSIS W/ CULTURE IF INDICATED - Abnormal; Notable for the following:        Result Value    Appearance, UA Turbid (*)     All other components within normal limits    Narrative:     Urine microscopic not indicated.   COMPREHENSIVE METABOLIC PANEL - Abnormal; Notable for the following:     Glucose 174 (*)     Potassium 3.3 (*)     BUN/Creatinine Ratio 25.4 (*)     All other components within normal limits   CBC WITH AUTO DIFFERENTIAL - Abnormal; Notable for the following:     WBC 12.84 (*)     Neutrophils, Absolute 9.94 (*)     Immature Grans, Absolute 0.03 (*)     All other components within normal limits   PROTIME-INR - Normal    Narrative:     Therapeutic range for most indications is 2.0-3.0 INR,  or 2.5-3.5 for mechanical heart valves.   APTT - Normal    Narrative:     The  recommended Heparin therapeutic range is 68-97 seconds.   TROPONIN (IN-HOUSE) - Normal   CK - Normal   CK MB - Normal   D-DIMER, QUANTITATIVE - Normal    Narrative:     Dimer values <500 ng/ml FEU are FDA approved as aid in diagnosis of deep venous thrombosis and pulmonary embolism.  This test should not be used in an exclusion strategy with pretest probability alone.    A recent guideline regarding diagnosis for pulmonary thomboembolism recommends an adjusted exclusion criterion of age x 10 ng/ml FEU for patients >50 years of age (Olga Intern Med 2015; 163: 701-711).   RAINBOW DRAW    Narrative:     The following orders were created for panel order [place] Draw.  Procedure                               Abnormality         Status                     ---------                               -----------         ------                     Light Blue Top[[id number]]                                    In process                 Green Top (Gel)[[id number]]                                   In process                 Lavender Top[[id number]]                                      In process                 Gold Top - SST[[id number]]                                    In process                   Please view results for these tests on the individual orders.   TROPONIN (IN-HOUSE)   CBC AND DIFFERENTIAL    Narrative:     The following orders were created for panel order CBC & Differential.  Procedure                               Abnormality         Status                     ---------                               -----------         ------                     CBC Auto Differential[[id number]]         Abnormal            Final result                 Please view results for these tests on the individual orders.   LIGHT BLUE TOP   GREEN TOP   LAVENDER TOP   GOLD TOP - SST   Ct Head Without Contrast    Result Date: 2/15/2017  Narrative: Exam: Head CT without contrast INDICATION: Dizziness Technical: Routine head CT without contrast. Sagittal  and coronal reconstructions were obtained. CT technique performed using ALARA. FINDINGS: The bony calvarium is intact. The imaged paranasal sinuses and mastoid air cells are clear. No extra-axial fluid collection. The ventricular system is normal. Normal gray-white differentiation. No obvious sign of acute infarction. No intraparenchymal hemorrhage, mass or midline shift.     Impression: No obvious acute intracranial abnormality. Consider MRI for further assessment if clinically warranted. Electronically signed by:  Hossein Rutledge MD  2/15/2017 11:20 PM CST Workstation: [employer]     Chest 1 View    Result Date: 2/15/2017  Narrative: Exam: AP portable chest INDICATION: Numbness, arm pain FINDINGS: AP portable chest. The bony structures are intact. The cardiomediastinal elsewhere is unremarkable. Eventration of the right hemidiaphragm. Lungs are clear. No pneumothorax or pleural effusion.     Impression: No acute cardiopulmonary abnormality. Electronically signed by:  Hossein Rutledge MD  2/15/2017 11:22 PM CST Workstation: [employer] minutes              [facility]    Final diagnoses:   Dizziness   Abnormal EKG            Chepe Pastor MD  02/16/17 0200

## 2017-02-17 VITALS
WEIGHT: 154.1 LBS | OXYGEN SATURATION: 97 % | TEMPERATURE: 97.2 F | HEIGHT: 62 IN | BODY MASS INDEX: 28.36 KG/M2 | DIASTOLIC BLOOD PRESSURE: 72 MMHG | SYSTOLIC BLOOD PRESSURE: 118 MMHG | RESPIRATION RATE: 18 BRPM | HEART RATE: 84 BPM

## 2017-02-17 PROBLEM — I51.4 MYOCARDITIS (HCC): Status: ACTIVE | Noted: 2017-02-17

## 2017-02-17 LAB
ANION GAP SERPL CALCULATED.3IONS-SCNC: 7 MMOL/L (ref 5–15)
BUN BLD-MCNC: 9 MG/DL (ref 7–21)
BUN/CREAT SERPL: 14.5 (ref 7–25)
CALCIUM SPEC-SCNC: 8.4 MG/DL (ref 8.4–10.2)
CHLORIDE SERPL-SCNC: 105 MMOL/L (ref 95–110)
CO2 SERPL-SCNC: 28 MMOL/L (ref 22–31)
CREAT BLD-MCNC: 0.62 MG/DL (ref 0.5–1)
DEPRECATED RDW RBC AUTO: 39.6 FL (ref 36.4–46.3)
ERYTHROCYTE [DISTWIDTH] IN BLOOD BY AUTOMATED COUNT: 13.4 % (ref 11.5–14.5)
GFR SERPL CREATININE-BSD FRML MDRD: 99 ML/MIN/1.73 (ref 60–120)
GLUCOSE BLD-MCNC: 109 MG/DL (ref 60–100)
HCT VFR BLD AUTO: 40.9 % (ref 35–45)
HGB BLD-MCNC: 13.9 G/DL (ref 12–15.5)
MCH RBC QN AUTO: 27.4 PG (ref 26.5–34)
MCHC RBC AUTO-ENTMCNC: 34 G/DL (ref 31.4–36)
MCV RBC AUTO: 80.7 FL (ref 80–98)
PLATELET # BLD AUTO: 247 10*3/MM3 (ref 150–450)
PMV BLD AUTO: 9.6 FL (ref 8–12)
POTASSIUM BLD-SCNC: 3.8 MMOL/L (ref 3.5–5.1)
RBC # BLD AUTO: 5.07 10*6/MM3 (ref 3.77–5.16)
SODIUM BLD-SCNC: 140 MMOL/L (ref 137–145)
WBC NRBC COR # BLD: 7.39 10*3/MM3 (ref 3.2–9.8)

## 2017-02-17 PROCEDURE — G0378 HOSPITAL OBSERVATION PER HR: HCPCS

## 2017-02-17 PROCEDURE — 80048 BASIC METABOLIC PNL TOTAL CA: CPT | Performed by: INTERNAL MEDICINE

## 2017-02-17 PROCEDURE — 85027 COMPLETE CBC AUTOMATED: CPT | Performed by: INTERNAL MEDICINE

## 2017-02-17 RX ORDER — CARVEDILOL 3.12 MG/1
3.12 TABLET ORAL 2 TIMES DAILY WITH MEALS
Qty: 60 TABLET | Refills: 1 | Status: SHIPPED | OUTPATIENT
Start: 2017-02-17 | End: 2017-02-23 | Stop reason: DRUGHIGH

## 2017-02-17 RX ORDER — ASPIRIN 81 MG/1
81 TABLET ORAL DAILY
Qty: 30 TABLET | Refills: 1 | Status: SHIPPED | OUTPATIENT
Start: 2017-02-17

## 2017-02-17 RX ORDER — ATORVASTATIN CALCIUM 10 MG/1
10 TABLET, FILM COATED ORAL DAILY
Qty: 10 TABLET | Refills: 1 | Status: SHIPPED | OUTPATIENT
Start: 2017-02-17 | End: 2017-02-23 | Stop reason: SDUPTHER

## 2017-02-17 RX ORDER — LOSARTAN POTASSIUM 25 MG/1
25 TABLET ORAL
Qty: 25 TABLET | Refills: 1 | Status: SHIPPED | OUTPATIENT
Start: 2017-02-17 | End: 2017-02-23 | Stop reason: SDUPTHER

## 2017-02-17 RX ADMIN — SODIUM CHLORIDE 75 ML/HR: 4.5 INJECTION, SOLUTION INTRAVENOUS at 04:03

## 2017-02-17 RX ADMIN — CARVEDILOL 3.12 MG: 3.12 TABLET, FILM COATED ORAL at 09:12

## 2017-02-17 RX ADMIN — LOSARTAN POTASSIUM 25 MG: 25 TABLET ORAL at 09:13

## 2017-02-17 NOTE — PLAN OF CARE
Problem: Patient Care Overview (Adult)  Goal: Adult Individualization and Mutuality  Outcome: Ongoing (interventions implemented as appropriate)    02/16/17 1642   Individualization   Patient Specific Preferences likes grape juice       Goal: Discharge Needs Assessment  Outcome: Ongoing (interventions implemented as appropriate)    Problem: Fall Risk (Adult)  Goal: Absence of Falls  Outcome: Ongoing (interventions implemented as appropriate)  Steady gait. No falls

## 2017-02-17 NOTE — DISCHARGE SUMMARY
Discharge Summary  Giovanny Nunez MD  Hospitalist     Date of Discharge:  2/17/2017    Discharge Diagnosis: Small AMI (Non STEMI) vs possible Myocarditis    Presenting Problem/History of Present Illness  Dizziness [R42] / Chest pain  Possible AMI (Non STEMI) vs Myocarditis  Mild HTN     Hospital Course  Patient is a 56 y.o. female presented with dyspnea, chest discomfort, non specific ECG abnormalities, elevated Troponin values (up to 3.2). Her cardiac cath showed minimal coronary artery disease - there was no need for angioplasty / stenting. She will be managed medically.      Procedures Performed  Procedure(s):  Left Heart Cath    Consults:   Consults     Date and Time Order Name Status Description    2/16/2017 0817 Inpatient Consult to Cardiothoracic Surgery      2/16/2017 0809 Inpatient Consult to Cardiology Completed     2/16/2017 0200 Hospitalist (on-call MD unless specified)            Pertinent Test Results: angiography: minimal coronary artery disease - no need for angioplasty    Condition on Discharge:  stable    Vital Signs  Temp:  [96.3 °F (35.7 °C)-98 °F (36.7 °C)] 97.2 °F (36.2 °C)  Heart Rate:  [63-84] 84  Resp:  [18] 18  BP: (111-161)/(59-79) 118/72    Physical Exam:  Physical Exam   Constitutional: She is oriented to person, place, and time. She appears well-developed and well-nourished.   HENT:   Head: Normocephalic and atraumatic.   Eyes: EOM are normal.   Neck: Neck supple.   Cardiovascular: Normal rate and regular rhythm.    No murmur heard.  Pulmonary/Chest: Effort normal and breath sounds normal. No respiratory distress. She has no rales.   Abdominal: Soft. Bowel sounds are normal. She exhibits no distension. There is no tenderness.   Musculoskeletal: She exhibits no edema.   Neurological: She is alert and oriented to person, place, and time.   Skin: Skin is warm and dry.   Psychiatric: She has a normal mood and affect.   Vitals reviewed.      Discharge Disposition  Home or Self  Care    Discharge Medications   Christina Hobson   Home Medication Instructions MAKAYLA:[id number]    Printed on:02/17/17 1019   Medication Information                      aspirin 81 MG EC tablet  Take 1 tablet by mouth Daily.             atorvastatin (LIPITOR) 10 MG tablet  Take 1 tablet by mouth Daily.             carvedilol (COREG) 3.125 MG tablet  Take 1 tablet by mouth 2 (Two) Times a Day With Meals.             losartan (COZAAR) 25 MG tablet  Take 1 tablet by mouth Daily.             Multiple Vitamins-Minerals (MULTIVITAMIN WITH MINERALS) tablet tablet  Take 1 tablet by mouth Daily.                 Discharge Diet:   Diet Instructions     Advance Diet As Tolerated                     Activity at Discharge:   Activity Instructions     Activity as Tolerated                     Follow-up Appointments  No future appointments.  Referrals and Follow-ups to Schedule     Additional Follow-Up    As directed    Dr Medina in 6 weeks       Follow-Up    As directed    PCP of choice in 1 week                    Giovanny Nunez MD  02/17/17  10:19 AM

## 2017-02-23 ENCOUNTER — OFFICE VISIT (OUTPATIENT)
Dept: FAMILY MEDICINE CLINIC | Facility: CLINIC | Age: 57
End: 2017-02-23

## 2017-02-23 ENCOUNTER — TELEPHONE (OUTPATIENT)
Dept: FAMILY MEDICINE CLINIC | Facility: CLINIC | Age: 57
End: 2017-02-23

## 2017-02-23 VITALS
DIASTOLIC BLOOD PRESSURE: 90 MMHG | TEMPERATURE: 97.3 F | WEIGHT: 152.2 LBS | HEART RATE: 98 BPM | SYSTOLIC BLOOD PRESSURE: 150 MMHG | OXYGEN SATURATION: 98 % | HEIGHT: 63 IN | BODY MASS INDEX: 26.97 KG/M2

## 2017-02-23 DIAGNOSIS — I20.8 ATYPICAL ANGINA (HCC): ICD-10-CM

## 2017-02-23 DIAGNOSIS — I10 ESSENTIAL HYPERTENSION: Primary | ICD-10-CM

## 2017-02-23 PROCEDURE — 99203 OFFICE O/P NEW LOW 30 MIN: CPT | Performed by: FAMILY MEDICINE

## 2017-02-23 RX ORDER — LOSARTAN POTASSIUM 25 MG/1
25 TABLET ORAL
Qty: 30 TABLET | Refills: 1 | Status: SHIPPED | OUTPATIENT
Start: 2017-02-23

## 2017-02-23 RX ORDER — ATORVASTATIN CALCIUM 10 MG/1
10 TABLET, FILM COATED ORAL DAILY
Qty: 30 TABLET | Refills: 1 | Status: SHIPPED | OUTPATIENT
Start: 2017-02-23

## 2017-02-23 RX ORDER — NITROGLYCERIN 0.4 MG/1
0.4 TABLET SUBLINGUAL
Qty: 25 TABLET | Refills: 12 | Status: SHIPPED | OUTPATIENT
Start: 2017-02-23

## 2017-02-23 RX ORDER — CARVEDILOL 6.25 MG/1
6.25 TABLET ORAL 2 TIMES DAILY WITH MEALS
Qty: 60 TABLET | Refills: 5 | Status: SHIPPED | OUTPATIENT
Start: 2017-02-23

## 2017-02-23 NOTE — TELEPHONE ENCOUNTER
----- Message from Angelina Buck MA sent at 2/23/2017  1:21 PM CST -----  Regarding: FW: Prescription Question  Contact: [phone]   I CALLED PATIENT. SHE NEEDS REFILLS OF THESE 2 MEDS IF YOU WANT HER TO KEEP TAKING THEM.  [facility]    ----- Message -----     From: Christina KERR     Sent: 2/23/2017  12:28 PM       To: Mathieu Deluna [facility]  Subject: Prescription Question                            Hi. Am I getting a refill on losartan and atorvastatin ? Thx

## 2017-02-23 NOTE — PROGRESS NOTES
Subjective   Christina KERR is a 56 y.o. female.     History of Present Illness     Indigestion and left arm pain, elbow down.  Dizzy and didn't feel well.  Went to er.  Troponin I up to 3.790, clear coronary arteries via heart cath.  Started on coreg 3.125mg bid.    Has follow up with dr brown 1 month.  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   Pmh:  ? nonstemi 2/15/17   Psh:  D and c 24 yrs ago  Sh:  From [place], now owns and works a farm.  No tobacco, etoh, drugs.  .  Fh:  Dad lymphoma, mother heart problems.      Review of Systems   Constitutional: Negative for chills, fatigue and fever.   HENT: Negative for congestion, ear discharge, ear pain, facial swelling, hearing loss, postnasal drip, rhinorrhea, sinus pressure, sore throat, trouble swallowing and voice change.    Eyes: Negative for discharge, redness and visual disturbance.   Respiratory: Negative for cough, chest tightness, shortness of breath and wheezing.    Cardiovascular: Negative for chest pain and palpitations.   Gastrointestinal: Negative for abdominal pain, blood in stool, constipation, diarrhea, nausea and vomiting.   Endocrine: Negative for polydipsia and polyuria.   Genitourinary: Negative for dysuria, flank pain, hematuria and urgency.   Musculoskeletal: Negative for arthralgias, back pain, joint swelling and myalgias.   Skin: Negative for rash.   Neurological: Negative for dizziness, weakness, numbness and headaches.   Hematological: Negative for adenopathy.   Psychiatric/Behavioral: Negative for confusion and sleep disturbance. The patient is not nervous/anxious.        Objective   Physical Exam   Constitutional: She is oriented to person, place, and time. She appears well-developed and well-nourished.   HENT:   Head: Normocephalic and atraumatic.   Right Ear: External ear normal.   Left Ear: External ear normal.   Nose: Nose normal.   Mouth/Throat: Oropharynx is clear and moist.   Eyes: Conjunctivae and EOM  are normal. Pupils are equal, round, and reactive to light.   Neck: Normal range of motion. Neck supple.   Cardiovascular: Normal rate, regular rhythm and normal heart sounds.  Exam reveals no gallop and no friction rub.    No murmur heard.  Pulmonary/Chest: Effort normal and breath sounds normal.   Abdominal: Soft. Bowel sounds are normal. She exhibits no distension. There is no tenderness. There is no rebound and no guarding.   Musculoskeletal: Normal range of motion. She exhibits no edema or deformity.   Neurological: She is alert and oriented to person, place, and time. No cranial nerve deficit.   Skin: Skin is warm and dry. No rash noted. No erythema.   Psychiatric: She has a normal mood and affect. Her behavior is normal. Judgment and thought content normal.   Nursing note and vitals reviewed.      Assessment/Plan   Christina was seen today for follow-up.    Diagnoses and all orders for this visit:    Essential hypertension  -     carvedilol (COREG) 6.25 MG tablet; Take 1 tablet by mouth 2 (Two) Times a Day With Meals.    Atypical angina  -     nitroglycerin (NITROSTAT) 0.4 MG SL tablet; Place 1 tablet under the tongue Every 5 (Five) Minutes As Needed for chest pain. Take no more than 3 doses in 15 minutes.      Increased coreg dose and she is to watch bp.  Report to me if not at goal.  Also given nitrostat in case has symptoms again.    Suggested she ask dr brown regarding vasospastic angina.  Treatment would be different.

## 2017-03-01 LAB
BH CV ECHO MEAS - ACS: 2 CM
BH CV ECHO MEAS - AO MAX PG (FULL): 2.4 MMHG
BH CV ECHO MEAS - AO MAX PG: 8.5 MMHG
BH CV ECHO MEAS - AO MEAN PG (FULL): 1.1 MMHG
BH CV ECHO MEAS - AO MEAN PG: 4.6 MMHG
BH CV ECHO MEAS - AO ROOT AREA: 5.5 CM^2
BH CV ECHO MEAS - AO ROOT DIAM: 2.7 CM
BH CV ECHO MEAS - AO V2 MAX: 145.4 CM/SEC
BH CV ECHO MEAS - AO V2 MEAN: 100.2 CM/SEC
BH CV ECHO MEAS - AO V2 VTI: 31.1 CM
BH CV ECHO MEAS - AVA(I,A): 2.7 CM^2
BH CV ECHO MEAS - AVA(I,D): 2.7 CM^2
BH CV ECHO MEAS - AVA(V,A): 2.5 CM^2
BH CV ECHO MEAS - AVA(V,D): 2.5 CM^2
BH CV ECHO MEAS - EDV(CUBED): 77.6 ML
BH CV ECHO MEAS - EDV(TEICH): 81.5 ML
BH CV ECHO MEAS - EF(CUBED): 75.8 %
BH CV ECHO MEAS - EF(TEICH): 68.1 %
BH CV ECHO MEAS - ESV(CUBED): 18.8 ML
BH CV ECHO MEAS - ESV(TEICH): 26 ML
BH CV ECHO MEAS - FS: 37.7 %
BH CV ECHO MEAS - IVS/LVPW: 0.87
BH CV ECHO MEAS - IVSD: 0.84 CM
BH CV ECHO MEAS - LA DIMENSION: 2.3 CM
BH CV ECHO MEAS - LA/AO: 0.89
BH CV ECHO MEAS - LV MASS(C)D: 122.6 GRAMS
BH CV ECHO MEAS - LV MAX PG: 6 MMHG
BH CV ECHO MEAS - LV MEAN PG: 3.4 MMHG
BH CV ECHO MEAS - LV V1 MAX: 123 CM/SEC
BH CV ECHO MEAS - LV V1 MEAN: 88.2 CM/SEC
BH CV ECHO MEAS - LV V1 VTI: 28.2 CM
BH CV ECHO MEAS - LVIDD: 4.3 CM
BH CV ECHO MEAS - LVIDS: 2.7 CM
BH CV ECHO MEAS - LVOT AREA (M): 2.8 CM^2
BH CV ECHO MEAS - LVOT AREA: 3 CM^2
BH CV ECHO MEAS - LVOT DIAM: 1.9 CM
BH CV ECHO MEAS - LVPWD: 0.97 CM
BH CV ECHO MEAS - MV A MAX VEL: 91.7 CM/SEC
BH CV ECHO MEAS - MV DEC SLOPE: 468.4 CM/SEC^2
BH CV ECHO MEAS - MV E MAX VEL: 102.4 CM/SEC
BH CV ECHO MEAS - MV E/A: 1.1
BH CV ECHO MEAS - MV P1/2T MAX VEL: 105.5 CM/SEC
BH CV ECHO MEAS - MV P1/2T: 66 MSEC
BH CV ECHO MEAS - MVA P1/2T LCG: 2.1 CM^2
BH CV ECHO MEAS - MVA(P1/2T): 3.3 CM^2
BH CV ECHO MEAS - PA MAX PG: 2.2 MMHG
BH CV ECHO MEAS - PA V2 MAX: 74.9 CM/SEC
BH CV ECHO MEAS - SV(AO): 171.3 ML
BH CV ECHO MEAS - SV(CUBED): 58.8 ML
BH CV ECHO MEAS - SV(LVOT): 84 ML
BH CV ECHO MEAS - SV(TEICH): 55.5 ML
BH CV ECHO MEAS - TR MAX VEL: 202.2 CM/SEC

## (undated) DEVICE — CATH GUIDE LAUNCHER JL4.0 6F 100CM

## (undated) DEVICE — SYRINGE KIT,PACKAGED,,150FT,MK 7(ANGIO-ARTERION, 150ML SYR KIT W/QFT,MC)(60729385): Brand: MEDRAD® MARK 7 ARTERION DISPOSABLE SYRINGE 150 ML WITH QUICK FILL TUBE

## (undated) DEVICE — ANGIO-SEAL EVOLUTION VASCULAR CLOSURE DEVICE: Brand: ANGIO-SEAL

## (undated) DEVICE — VSI MICRO-INTRODUCER KITS ARE INTENDED FOR USE IN PERCUTANEOUS INTRODUCTION OF UP TO A 0.018 INCH OR 0.038 INCH GUIDEWIRE OR CATHETER INTO THE VASCULAR SYSTEM FOLLOWING A SMALL GAUGE NEEDLE STICK.: Brand: VSI MICRO-INTRODUCER KIT

## (undated) DEVICE — GW PERIPH GUIDERIGHT STD/J/TP PTFE/PCOAT SS 0.038IN 5X150CM

## (undated) DEVICE — ELECTRODE,RT,STRESS,FOAM,50PK: Brand: MEDLINE

## (undated) DEVICE — INTRO SHEATH ART/FEM ENGAGE .038 6F12CM

## (undated) DEVICE — PK CATH LAB 60

## (undated) DEVICE — CATH DIAG EXPO M/ PK 6FR FL4/FR4 PIG 3PK